# Patient Record
Sex: MALE | Race: WHITE
[De-identification: names, ages, dates, MRNs, and addresses within clinical notes are randomized per-mention and may not be internally consistent; named-entity substitution may affect disease eponyms.]

---

## 2018-03-29 ENCOUNTER — HOSPITAL ENCOUNTER (EMERGENCY)
Dept: HOSPITAL 74 - FER | Age: 78
Discharge: HOME | End: 2018-03-29
Payer: COMMERCIAL

## 2018-03-29 VITALS — HEART RATE: 42 BPM | DIASTOLIC BLOOD PRESSURE: 41 MMHG | SYSTOLIC BLOOD PRESSURE: 114 MMHG | TEMPERATURE: 97.7 F

## 2018-03-29 VITALS — BODY MASS INDEX: 29.7 KG/M2

## 2018-03-29 DIAGNOSIS — I10: ICD-10-CM

## 2018-03-29 DIAGNOSIS — Z79.82: ICD-10-CM

## 2018-03-29 DIAGNOSIS — Z95.5: ICD-10-CM

## 2018-03-29 DIAGNOSIS — J81.0: Primary | ICD-10-CM

## 2018-03-29 DIAGNOSIS — Z95.1: ICD-10-CM

## 2018-03-29 DIAGNOSIS — E11.9: ICD-10-CM

## 2018-03-29 DIAGNOSIS — E78.5: ICD-10-CM

## 2018-03-29 DIAGNOSIS — Z79.84: ICD-10-CM

## 2018-03-29 DIAGNOSIS — I49.9: ICD-10-CM

## 2018-03-29 DIAGNOSIS — Z87.891: ICD-10-CM

## 2018-03-29 DIAGNOSIS — I25.10: ICD-10-CM

## 2018-03-29 LAB
ALBUMIN SERPL-MCNC: 3.8 G/DL (ref 3.5–5)
ALP SERPL-CCNC: 33 U/L (ref 32–92)
ALT SERPL-CCNC: 12 U/L (ref 10–40)
ANION GAP SERPL CALC-SCNC: 11 MMOL/L (ref 8–16)
APTT BLD: 25.8 SECONDS (ref 24–38.9)
AST SERPL-CCNC: 23 U/L (ref 10–42)
BASOPHILS # BLD: 0.5 % (ref 0–2)
BILIRUB SERPL-MCNC: 0.9 MG/DL (ref 0.2–1)
BUN SERPL-MCNC: 49 MG/DL (ref 7–18)
CALCIUM SERPL-MCNC: 9.1 MG/DL (ref 8.4–10.2)
CHLORIDE SERPL-SCNC: 99 MMOL/L (ref 98–107)
CO2 SERPL-SCNC: 26 MMOL/L (ref 22–28)
CREAT SERPL-MCNC: 1.5 MG/DL (ref 0.6–1.3)
DEPRECATED RDW RBC AUTO: 14.7 % (ref 11.9–15.9)
EOSINOPHIL # BLD: 3.6 % (ref 0–4.5)
GLUCOSE SERPL-MCNC: 124 MG/DL (ref 74–106)
HCT VFR BLD CALC: 35.7 % (ref 35.4–49)
HGB BLD-MCNC: 12.1 GM/DL (ref 11.7–16.9)
INR BLD: 1.13 (ref 0.82–1.09)
LYMPHOCYTES # BLD: 27.6 % (ref 8–40)
MCH RBC QN AUTO: 29.6 PG (ref 25.7–33.7)
MCHC RBC AUTO-ENTMCNC: 33.7 G/DL (ref 32–35.9)
MCV RBC: 87.9 FL (ref 80–96)
MONOCYTES # BLD AUTO: 16.2 % (ref 3.8–10.2)
NEUTROPHILS # BLD: 52.1 % (ref 42.8–82.8)
PH BLDV: 7.39 [PH] (ref 7.32–7.42)
PLATELET # BLD AUTO: 176 K/MM3 (ref 134–434)
PMV BLD: 12.6 FL (ref 7.5–11.1)
POTASSIUM SERPLBLD-SCNC: 4.3 MMOL/L (ref 3.5–5.1)
PROT SERPL-MCNC: 6.7 G/DL (ref 6.4–8.3)
PT PNL PPP: 12.6 SEC (ref 10.2–13)
RBC # BLD AUTO: 4.07 M/MM3 (ref 4–5.6)
SODIUM SERPL-SCNC: 136 MMOL/L (ref 136–145)
VENOUS PC02: 46.2 MMHG (ref 38–52)
VENOUS PO2: 23.4 MMHG (ref 28–48)
WBC # BLD AUTO: 6.2 K/MM3 (ref 4–10.8)

## 2018-03-29 PROCEDURE — 3E033GC INTRODUCTION OF OTHER THERAPEUTIC SUBSTANCE INTO PERIPHERAL VEIN, PERCUTANEOUS APPROACH: ICD-10-PCS | Performed by: EMERGENCY MEDICINE

## 2018-03-29 NOTE — PDOC
History of Present Illness





- General


Chief Complaint: Respiratory


Stated Complaint: COOGH AND CONGESTION


Time Seen by Provider: 03/29/18 18:20





Past History





- Past Medical History


Allergies/Adverse Reactions: 


 Allergies











Allergy/AdvReac Type Severity Reaction Status Date / Time


 


No Known Allergies Allergy   Verified 03/29/18 18:33











Home Medications: 


Ambulatory Orders





Aspirin 81 mg PO DAILY 03/29/18 


Azithromycin 250 mg PO ASDIR 03/29/18 


Glimepiride 2 mg PO DAILY 03/29/18 


Hydrochlorothiazide 12.5 mg PO DAILY 03/29/18 


Metformin HCl [Glucophage] 1,000 mg PO BID 03/29/18 


Metoprolol Tartrate 12.5 mg PO BID 03/29/18 


Ramipril 10 mg PO DAILY 03/29/18 


Rosuvastatin [Crestor -] 20 mg PO DAILY 03/29/18 








Cardiac Disorders: Yes (irregular heart beat)


COPD: No


Diabetes: Yes


Hypercholesterolemia: Yes





- Surgical History


Cardiac Surgery: Yes (bypass 27 years ago)





- Suicide/Smoking/Psychosocial Hx


Smoking History: Former smoker


Have you smoked in the past 12 months: No


If you are a former smoker, when did you quit?: 27 years ago


Information on smoking cessation initiated: No


Hx Alcohol Use: Yes (wine)


Drug/Substance Use Hx: No


Substance Use Type: None





*Physical Exam





- Vital Signs


 Last Vital Signs











Temp Pulse Resp BP Pulse Ox


 


 97.7 F   42 L  20   114/41   99 


 


 03/29/18 18:18  03/29/18 18:18  03/29/18 18:18  03/29/18 18:18  03/29/18 18:18














*DC/Admit/Observation/Transfer





- Discharge Dispostion


Condition at time of disposition: Fair





- Referrals





- Patient Instructions





- Post Discharge Activity

## 2018-03-29 NOTE — PDOC
History of Present Illness





<Trudy Juan - Last Filed: 03/29/18 19:02>





<Tod Marie - Last Filed: 03/30/18 01:55>





- History of Present Illness


Initial Comments: 





03/29/18 18:50


"The patient is a 77 year old male with history of hypertension, hyperlipidemia

, CAD s/p stent, who presents to the ED complaining of several days of cough, 

congestion, and shortness of breath. He describes his shortness of breath as 

worse with walking and lying flat. 2 days ago he was seen by his doctor who 

started him on cough syrup and antibiotics without any improvement. Pt denies 

fever or chills. No chest pain. No nausea, vomiting, or diaphoresis. He does 

report some mild b/l LE but states it is unchanged. He denies sick contacts. He 

denies recent travel or long periods of immobilization. 


Pt went to [place] ED last night but was in the waiting room for 5 hours and 

left prior to eval.





PCP: Dr. Moore"





<Bijan Gayle - Last Filed: 03/30/18 07:13>





- General


Chief Complaint: Respiratory


Stated Complaint: COOGH AND CONGESTION


Time Seen by Provider: 03/29/18 18:20





Past History





<Trudy Juan - Last Filed: 03/29/18 19:02>





<Tod Marie - Last Filed: 03/30/18 01:55>





- Past Medical History


Cardiac Disorders: Yes (irregular heart beat)


COPD: No


Diabetes: Yes


Hypercholesterolemia: Yes





- Surgical History


Cardiac Surgery: Yes (bypass 27 years ago)





- Suicide/Smoking/Psychosocial Hx


Smoking History: Former smoker


Have you smoked in the past 12 months: No


If you are a former smoker, when did you quit?: 27 years ago


Information on smoking cessation initiated: No


Hx Alcohol Use: Yes (wine)


Drug/Substance Use Hx: No


Substance Use Type: None





<Bijan Gayle - Last Filed: 03/30/18 07:13>





- Past Medical History


Allergies/Adverse Reactions: 


 Allergies











Allergy/AdvReac Type Severity Reaction Status Date / Time


 


No Known Allergies Allergy   Verified 03/29/18 18:33











Home Medications: 


Ambulatory Orders





Aspirin 81 mg PO DAILY 03/29/18 


Azithromycin 250 mg PO ASDIR 03/29/18 


Glimepiride 2 mg PO DAILY 03/29/18 


Metformin HCl [Glucophage] 1,000 mg PO BID 03/29/18 


Metoprolol Tartrate 12.5 mg PO BID 03/29/18 


Ramipril 10 mg PO DAILY 03/29/18 


Rosuvastatin [Crestor -] 20 mg PO DAILY 03/29/18 


Torsemide 20 mg PO DAILY 03/29/18 











**Review of Systems





- Review of Systems


Comments:: 





03/29/18 18:51


"GENERAL/CONSTITUTIONAL: No fever or chills. No weakness.


HEAD, EYES, EARS, NOSE AND THROAT: No change in vision. No ear pain or 

discharge. No sore throat.


CARDIOVASCULAR: + SOB, No chest pain


RESPIRATORY: No cough, wheezing, or hemoptysis.


GASTROINTESTINAL: No nausea, vomiting, diarrhea or constipation.


GENITOURINARY: No dysuria, frequency, or change in urination.


MUSCULOSKELETAL: No joint or muscle swelling or pain. No neck or back pain.


SKIN: No rash


NEUROLOGIC: No headache, vertigo, loss of consciousness, or change in strength/

sensation.


ENDOCRINE: No increased thirst. No abnormal weight change.


HEMATOLOGIC/LYMPHATIC: No anemia, easy bleeding, or history of blood clots.


ALLERGIC/IMMUNOLOGIC: No hives or skin allergy.


"





<Bijan Gayle - Last Filed: 03/30/18 07:13>





*Physical Exam





- Vital Signs


 Last Vital Signs











Temp Pulse Resp BP Pulse Ox


 


 97.7 F   42 L  20   114/41   99 


 


 03/29/18 18:18  03/29/18 18:18  03/29/18 18:18  03/29/18 18:18  03/29/18 18:18














<Trudy Juan - Last Filed: 03/29/18 19:02>





- Vital Signs


 Last Vital Signs











Temp Pulse Resp BP Pulse Ox


 


 97.7 F   42 L  20   114/41   99 


 


 03/29/18 18:18  03/29/18 18:18  03/29/18 18:18  03/29/18 18:18  03/29/18 18:18














<Tod Marie - Last Filed: 03/30/18 01:55>





- Vital Signs


 Last Vital Signs











Temp Pulse Resp BP Pulse Ox


 


 97.7 F   42 L  20   114/41   99 


 


 03/29/18 18:18  03/29/18 18:18  03/29/18 18:18  03/29/18 18:18  03/29/18 18:18














- Physical Exam


Comments: 





03/29/18 18:52


"GENERAL: Awake, alert, and fully oriented, in no acute distress


HEAD: No signs of trauma


EYES: PERRLA, EOMI, sclera anicteric, conjunctiva clear


ENT: Auricles normal inspection, hearing grossly normal, nares patent, 

oropharynx clear without exudates. Moist mucosa


NECK: Nontender, no stepoffs, Normal ROM, supple, no lymphadenopathy, JVD, or 

masses


LUNGS: + bilateral rales, no wheezes/rhonchi


HEART: Regular rate and rhythm, normal S1 and S2, no murmurs, rubs or gallops


ABDOMEN: Soft, nontender, normoactive bowel sounds.  No guarding, no rebound.  

No masses


EXTREMITIES: +1 PE BLE


NEUROLOGICAL: Cranial nerves II through XII intact. 5/5 strength and sensation 

in all extremities, Normal speech, normal gait, normal cerebellar function


SKIN: Warm, Dry, normal turgor, no rashes or lesions noted.


"





<Bijan Gayle - Last Filed: 03/30/18 07:13>





ED Treatment Course





- LABORATORY


CBC & Chemistry Diagram: 


 03/29/18 19:00





 03/29/18 19:00





- ADDITIONAL ORDERS


Additional order review: 


 Laboratory  Results











  03/29/18 03/29/18 03/29/18





  19:00 19:00 19:00


 


PT with INR   


 


INR   


 


PTT (Actin FS)   


 


VBG pH    7.39


 


POC VBG pCO2    46.2


 


POC VBG pO2    23.4 L


 


Mixed VBG HCO3    27.1 H


 


Sodium   136 


 


Potassium   4.3 


 


Chloride   99 


 


Carbon Dioxide   26 


 


Anion Gap   11 


 


BUN   49 H 


 


Creatinine   1.5 H 


 


Creat Clearance w eGFR   45.14 


 


Random Glucose   124 H 


 


Calcium   9.1 


 


Total Bilirubin   0.9 


 


AST   23 


 


ALT   12 


 


Alkaline Phosphatase   33 


 


Creatine Kinase   115 


 


Troponin I  < 0.03  


 


B-Natriuretic Peptide   


 


Total Protein   6.7 


 


Albumin   3.8 














  03/29/18 03/29/18





  19:00 19:00


 


PT with INR  12.6 


 


INR  1.13 


 


PTT (Actin FS)  25.8 L 


 


VBG pH  


 


POC VBG pCO2  


 


POC VBG pO2  


 


Mixed VBG HCO3  


 


Sodium  


 


Potassium  


 


Chloride  


 


Carbon Dioxide  


 


Anion Gap  


 


BUN  


 


Creatinine  


 


Creat Clearance w eGFR  


 


Random Glucose  


 


Calcium  


 


Total Bilirubin  


 


AST  


 


ALT  


 


Alkaline Phosphatase  


 


Creatine Kinase  


 


Troponin I  


 


B-Natriuretic Peptide   1730.20 H


 


Total Protein  


 


Albumin  








 











  03/29/18





  19:00


 


RBC  4.07


 


MCV  87.9


 


MCHC  33.7


 


RDW  14.7


 


MPV  12.6 H


 


Neutrophils %  52.1


 


Lymphocytes %  27.6


 


Monocytes %  16.2 H


 


Eosinophils %  3.6


 


Basophils %  0.5














- Medications


Given in the ED: 


ED Medications














Discontinued Medications














Generic Name Dose Route Start Last Admin





  Trade Name Eugeneq  PRN Reason Stop Dose Admin


 


Furosemide  40 mg  03/29/18 21:12  03/29/18 21:17





  Lasix Injection -  IVPUSH  03/29/18 21:13  40 mg





  ONCE ONE   Administration














<Tod Marie - Last Filed: 03/30/18 01:55>





- LABORATORY


CBC & Chemistry Diagram: 


 03/29/18 19:00





 03/29/18 19:00





- RADIOLOGY


Radiology Studies Ordered: 














 Category Date Time Status


 


 CHEST PA & LAT [RAD] Stat Radiology  03/29/18 18:48 Ordered














<Bijan Gayle - Last Filed: 03/30/18 07:13>





Medical Decision Making





- Medical Decision Making








03/30/18 01:55


improved after ED mgmt


labs, CXR reviewed


ambulatory in and around ED without distress


elevated BNP but cxr clear and no hypoxia


diuresed in ED


PCP fu for continuing care





<Tod Marie - Last Filed: 03/30/18 01:55>





- Medical Decision Making





03/29/18 18:53


77 M with several days of worsening cough, SHEETS, orthopnea. Exam notable for 

bilateral rales and LE edema, consistent with CHF. Also consider ACS given pt's 

h/o stents, though he denies chest pain. Will r/o PNA with CXR. Pt without 

asymmetric leg swelling to suggest DVT.


- Labs, trop, BNP


- EKG


- CXR





03/29/18 19:00


Pt signed out to Dr. Marie at 7pm, pending labwork, CXR, EKG, and re-

evaluation.








<Bijan Gayle - Last Filed: 03/30/18 07:13>





*DC/Admit/Observation/Transfer





<Trudy Juan - Last Filed: 03/29/18 19:02>





<Tod Marie - Last Filed: 03/30/18 01:55>





<Bijan Gayle - Last Filed: 03/30/18 07:13>


Diagnosis at time of Disposition: 


Pulmonary edema


Qualifiers:


 Chronicity: acute Qualified Code(s): J81.0 - Acute pulmonary edema








- Discharge Dispostion


Disposition: HOME


Condition at time of disposition: Good





- Referrals


Referrals: 


Jerson Moore [Primary Care Provider] - Call tomorrow





- Patient Instructions


Printed Discharge Instructions:  DI for Heart Failure

## 2018-03-30 NOTE — EKG
Test Reason : 

Blood Pressure : ***/*** mmHG

Vent. Rate : 079 BPM     Atrial Rate : 079 BPM

   P-R Int : 230 ms          QRS Dur : 092 ms

    QT Int : 418 ms       P-R-T Axes : 058 -13 039 degrees

   QTc Int : 479 ms

 

SINUS RHYTHM WITH 1ST DEGREE A-V BLOCK WITH FREQUENT PREMATURE

VENTRICULAR COMPLEXES

NO PREVIOUS ECGS AVAILABLE

Confirmed by MAIKEL PHILLIPS MD ([id number]) on 3/30/2018 10:01:15 AM

 

Referred By: DR GILLESPIE           Confirmed By:MAIEKL PHILLIPS MD